# Patient Record
Sex: FEMALE | Race: WHITE | Employment: UNEMPLOYED
[De-identification: names, ages, dates, MRNs, and addresses within clinical notes are randomized per-mention and may not be internally consistent; named-entity substitution may affect disease eponyms.]

---

## 2017-09-10 ENCOUNTER — HEALTH MAINTENANCE LETTER (OUTPATIENT)
Age: 27
End: 2017-09-10

## 2019-11-08 ENCOUNTER — HEALTH MAINTENANCE LETTER (OUTPATIENT)
Age: 29
End: 2019-11-08

## 2020-02-23 ENCOUNTER — HEALTH MAINTENANCE LETTER (OUTPATIENT)
Age: 30
End: 2020-02-23

## 2020-12-06 ENCOUNTER — HEALTH MAINTENANCE LETTER (OUTPATIENT)
Age: 30
End: 2020-12-06

## 2021-09-17 LAB — PAP SMEAR - HIM PATIENT REPORTED: NEGATIVE

## 2021-09-25 ENCOUNTER — HEALTH MAINTENANCE LETTER (OUTPATIENT)
Age: 31
End: 2021-09-25

## 2021-11-19 ENCOUNTER — TRANSFERRED RECORDS (OUTPATIENT)
Dept: HEALTH INFORMATION MANAGEMENT | Facility: CLINIC | Age: 31
End: 2021-11-19
Payer: COMMERCIAL

## 2021-11-19 ENCOUNTER — MEDICAL CORRESPONDENCE (OUTPATIENT)
Dept: HEALTH INFORMATION MANAGEMENT | Facility: CLINIC | Age: 31
End: 2021-11-19
Payer: COMMERCIAL

## 2021-11-22 ENCOUNTER — TRANSCRIBE ORDERS (OUTPATIENT)
Dept: MATERNAL FETAL MEDICINE | Facility: CLINIC | Age: 31
End: 2021-11-22
Payer: COMMERCIAL

## 2021-11-22 DIAGNOSIS — O26.90 PREGNANCY RELATED CONDITION, ANTEPARTUM: Primary | ICD-10-CM

## 2021-12-31 ENCOUNTER — PRE VISIT (OUTPATIENT)
Dept: MATERNAL FETAL MEDICINE | Facility: CLINIC | Age: 31
End: 2021-12-31
Payer: COMMERCIAL

## 2022-01-04 ENCOUNTER — HOSPITAL ENCOUNTER (OUTPATIENT)
Dept: ULTRASOUND IMAGING | Facility: CLINIC | Age: 32
End: 2022-01-04
Attending: OBSTETRICS & GYNECOLOGY
Payer: COMMERCIAL

## 2022-01-04 ENCOUNTER — OFFICE VISIT (OUTPATIENT)
Dept: MATERNAL FETAL MEDICINE | Facility: CLINIC | Age: 32
End: 2022-01-04
Attending: OBSTETRICS & GYNECOLOGY
Payer: COMMERCIAL

## 2022-01-04 DIAGNOSIS — Z36.2 ENCOUNTER FOR FOLLOW-UP ULTRASOUND OF FETAL ANATOMY: ICD-10-CM

## 2022-01-04 DIAGNOSIS — O26.90 PREGNANCY RELATED CONDITION, ANTEPARTUM: ICD-10-CM

## 2022-01-04 DIAGNOSIS — O26.90 PREGNANCY RELATED CONDITION, ANTEPARTUM: Primary | ICD-10-CM

## 2022-01-04 PROCEDURE — 76811 OB US DETAILED SNGL FETUS: CPT | Mod: 26 | Performed by: OBSTETRICS & GYNECOLOGY

## 2022-01-04 PROCEDURE — 76811 OB US DETAILED SNGL FETUS: CPT

## 2022-01-04 NOTE — PROGRESS NOTES
Lisa Irene was seen for an ultrasound today at the Maternal-Fetal Medicine center.      For the details of the ultrasound please see the report which can be found under the imaging tab.      Miley Sheikh MD  , OB/GYN  Maternal-Fetal Medicine  [email or web address]  [phone] (Main MFM Office)  [id number] or [phone] (for 24 hour MFM questions)  [phone] (Pager)

## 2022-01-15 ENCOUNTER — HEALTH MAINTENANCE LETTER (OUTPATIENT)
Age: 32
End: 2022-01-15

## 2022-01-26 ENCOUNTER — HOSPITAL ENCOUNTER (OUTPATIENT)
Dept: ULTRASOUND IMAGING | Facility: CLINIC | Age: 32
End: 2022-01-26
Attending: OBSTETRICS & GYNECOLOGY
Payer: COMMERCIAL

## 2022-01-26 ENCOUNTER — OFFICE VISIT (OUTPATIENT)
Dept: MATERNAL FETAL MEDICINE | Facility: CLINIC | Age: 32
End: 2022-01-26
Attending: OBSTETRICS & GYNECOLOGY
Payer: COMMERCIAL

## 2022-01-26 DIAGNOSIS — O26.90 PREGNANCY RELATED CONDITION, ANTEPARTUM: Primary | ICD-10-CM

## 2022-01-26 DIAGNOSIS — O24.410 DIET CONTROLLED GESTATIONAL DIABETES MELLITUS (GDM), ANTEPARTUM: ICD-10-CM

## 2022-01-26 DIAGNOSIS — Z36.2 ENCOUNTER FOR FOLLOW-UP ULTRASOUND OF FETAL ANATOMY: ICD-10-CM

## 2022-01-26 PROCEDURE — 76816 OB US FOLLOW-UP PER FETUS: CPT

## 2022-01-26 PROCEDURE — 76816 OB US FOLLOW-UP PER FETUS: CPT | Mod: 26 | Performed by: OBSTETRICS & GYNECOLOGY

## 2022-01-26 NOTE — PROGRESS NOTES
The patient was seen for an ultrasound in the Maternal-Fetal Medicine Center at the [facility] today.  For a detailed report of the ultrasound examination, please see the ultrasound report which can be found under the imaging tab.    Geetha Colby MD  , OB/GYN  Maternal-Fetal Medicine  [phone] (Pager)

## 2022-02-07 ENCOUNTER — VIRTUAL VISIT (OUTPATIENT)
Dept: EDUCATION SERVICES | Facility: CLINIC | Age: 32
End: 2022-02-07
Payer: COMMERCIAL

## 2022-02-07 DIAGNOSIS — O24.419 GESTATIONAL DIABETES MELLITUS: Primary | ICD-10-CM

## 2022-02-07 PROCEDURE — G0108 DIAB MANAGE TRN  PER INDIV: HCPCS | Performed by: DIETITIAN, REGISTERED

## 2022-02-07 NOTE — LETTER
2/7/2022         RE: Lisa Irene  [address] [facility] [address]        Dear Colleague,    Thank you for referring your patient, Lisa Irene, to the [facility] [place]. Please see a copy of my visit note below.    Diabetes Self-Management Education & Support    SUBJECTIVE/OBJECTIVE:  Presents for education related to gestational diabetes.  Type of service:  Video Visit  Originating Location (pt. Location): Home  Distant Location (provider location): Home  Mode of Communication:  Video Conference via [employer]  Start: 02/07/2022 02:04 pm  Stop: 02/07/2022 02:34 pm    Accompanied by: Self  Diabetes management related comments/concerns: Took the 1 hour OGTT  Gestational weeks: 31 weeks  Previously had Gestational Diabetes: No    Cultural Influences/Ethnic Background:  Choose not to answer    Estimated Date of Delivery: Apr 7, 2022, hoping to go at 39 weeks nearer 4/1      Lifestyle and Health Behaviors:  Exercise:: Yes  Meal planning/habits: Avoiding sweets    Using whole grains / higher fiber items   Chick pea pasta   Whole grain bread     Healthy Coping:  Emotional response to diabetes: Ready to learn  Stage of change: ACTION (Actively working towards change)    Current Management:   Diet and lifestyle     ASSESSMENT/INTERVENTION:  Reviewed diet, carbohydrate counting, GDM pathophysiology, blood sugars and assessment.  Lisa is doing well so far and already checking blood sugars with an accu chek guide     Date  Breakfast Lunch Dinner    Before After After After   2/1 94 101 92 98   2/2 94 105 96 101   2/3 95  94 100   2/14 91 105 111 101   2/5 85 93 111 95   2/6 95 106 101 95   2/7 100 104 98      Is eating carbohydrates - using many low carbohydrate options / keto type things.   Has initiated a GDM meal plan and has the correct gram levels, might fall below this slightly at lunch/dinner and has room to increase carbohydrates a little based on post prandial  numbers. Feels good with carbohydrates, eating and understanding. Ketones: Negative and trace - just doing once weekly after being negative.  [place] with different hs snacks to see if this has an impact on fasting blood sugars.     Educational topics covered today:  GDM diagnosis, pathophysiology, Risks and Complications of GDM, Means of controlling GDM, Using a Blood Glucose Monitor, Blood Glucose Goals, Logging and Interpreting Glucose Results, Ketone Testing, When to Call a Diabetes Educator or OB Provider, Healthy Eating During Pregnancy, Counting Carbohydrates, Meal Planning for GDM, and Physical Activity    Educational materials provided today: (emailed)  Maureen Understanding Gestational Diabetes  GDM Log Book  Care After Delivery    Pt verbalized understanding of concepts discussed and recommendations provided today.     PLAN:  Check glucose 4 times daily, before breakfast and 1 hour after each meal.   Check Ketones daily for one week, if negative, reduce testing to once a week.   Physical activity recommended: as tolearted.    Meal plan: 30-45 carbs at breakfast, 45-60 carbs at lunch, 45-60 carbs at supper, 15-30 carbs at 3 snacks a day.  Follow consistent CHO meal plan, eat CHO and protein/fat at all meals/snacks.    Call/e-mail/[employer] message diabetes educator if 3 or more blood sugars are above the goal in 1 week, if ketones are positive, or with questions/concerns.    Catherine Zuñiga RD, LD, [facility]  Diabetes Education    Time Spent: 30 minutes  Encounter Type: Individual       A copy of this encounter was shared with the provider.

## 2022-02-07 NOTE — PROGRESS NOTES
Diabetes Self-Management Education & Support    SUBJECTIVE/OBJECTIVE:  Presents for education related to gestational diabetes.  Type of service:  Video Visit  Originating Location (pt. Location): Home  Distant Location (provider location): Home  Mode of Communication:  Video Conference via [employer]  Start: 02/07/2022 02:04 pm  Stop: 02/07/2022 02:34 pm    Accompanied by: Self  Diabetes management related comments/concerns: Took the 1 hour OGTT  Gestational weeks: 31 weeks  Previously had Gestational Diabetes: No    Cultural Influences/Ethnic Background:  Choose not to answer    Estimated Date of Delivery: Apr 7, 2022, hoping to go at 39 weeks nearer 4/1      Lifestyle and Health Behaviors:  Exercise:: Yes  Meal planning/habits: Avoiding sweets    Using whole grains / higher fiber items   Chick pea pasta   Whole grain bread     Healthy Coping:  Emotional response to diabetes: Ready to learn  Stage of change: ACTION (Actively working towards change)    Current Management:   Diet and lifestyle     ASSESSMENT/INTERVENTION:  Reviewed diet, carbohydrate counting, GDM pathophysiology, blood sugars and assessment.  Lisa is doing well so far and already checking blood sugars with an accu chek guide     Date  Breakfast Lunch Dinner    Before After After After   2/1 94 101 92 98   2/2 94 105 96 101   2/3 95  94 100   2/14 91 105 111 101   2/5 85 93 111 95   2/6 95 106 101 95   2/7 100 104 98      Is eating carbohydrates - using many low carbohydrate options / keto type things.   Has initiated a GDM meal plan and has the correct gram levels, might fall below this slightly at lunch/dinner and has room to increase carbohydrates a little based on post prandial numbers. Feels good with carbohydrates, eating and understanding. Ketones: Negative and trace - just doing once weekly after being negative.  [place] with different hs snacks to see if this has an impact on fasting blood sugars.     Educational topics covered  today:  GDM diagnosis, pathophysiology, Risks and Complications of GDM, Means of controlling GDM, Using a Blood Glucose Monitor, Blood Glucose Goals, Logging and Interpreting Glucose Results, Ketone Testing, When to Call a Diabetes Educator or OB Provider, Healthy Eating During Pregnancy, Counting Carbohydrates, Meal Planning for GDM, and Physical Activity    Educational materials provided today: (emailed)  Maureen Ellis Gestational Diabetes  GDM Log Book  Care After Delivery    Pt verbalized understanding of concepts discussed and recommendations provided today.     PLAN:  Check glucose 4 times daily, before breakfast and 1 hour after each meal.   Check Ketones daily for one week, if negative, reduce testing to once a week.   Physical activity recommended: as tolearted.    Meal plan: 30-45 carbs at breakfast, 45-60 carbs at lunch, 45-60 carbs at supper, 15-30 carbs at 3 snacks a day.  Follow consistent CHO meal plan, eat CHO and protein/fat at all meals/snacks.    Call/e-mail/MyChart message diabetes educator if 3 or more blood sugars are above the goal in 1 week, if ketones are positive, or with questions/concerns.    Catherine Zuñiga RD, LD, [facility]  Diabetes Education    Time Spent: 30 minutes  Encounter Type: Individual       A copy of this encounter was shared with the provider.

## 2022-02-14 ENCOUNTER — VIRTUAL VISIT (OUTPATIENT)
Dept: EDUCATION SERVICES | Facility: CLINIC | Age: 32
End: 2022-02-14
Payer: COMMERCIAL

## 2022-02-14 DIAGNOSIS — O24.419 GESTATIONAL DIABETES MELLITUS: ICD-10-CM

## 2022-02-14 PROCEDURE — 98967 PH1 ASSMT&MGMT NQHP 11-20: CPT | Mod: 95 | Performed by: DIETITIAN, REGISTERED

## 2022-02-14 NOTE — PROGRESS NOTES
Diabetes Self-Management Education & Support    SUBJECTIVE/OBJECTIVE:  Presents for education related to gestational diabetes.  Type of Service: Telephone Visit  Originating Location (Patient Location): Home  Distant Location (Provider Location): Home  Mode of Communication:  video  start: 02/14/2022 02:03 pm  Stop: 02/14/2022 02:18 pm    Accompanied by: Self  Diabetes management related comments/concerns: Took the 1 hour OGTT  Gestational weeks: 31 weeks  Previously had Gestational Diabetes: No    Cultural Influences/Ethnic Background:  Choose not to answer    LMP 07/01/2021       Lifestyle and Health Behaviors:  Exercise:: Yes  Meal planning/habits: Avoiding sweets    Healthy Coping:  Emotional response to diabetes: Ready to learn  Stage of change: ACTION (Actively working towards change)    Current Management:   diet & Lifestyle     ASSESSMENT:  Ketones: negative (mod one time after not feeling well at night) .   Fasting blood glucoses: 14% in target.  After breakfast: 100% in target.  After lunch: 100% in target.  After dinner: 100% in target    Date Breakfast Breakfast Lunch Dinner    Before After After After   2/8 102 127 102 120   2/9 99 107 105 96   2/10 96 127 113 108   2/11 93 97 122 85   2/12 98 104 105 95   2/13 98 126  91   2/14 98 98 104    Added a protein waffle at breakfast at times (kodiak cakes)  Feels like the other meals are 45gm     INTERVENTION:  Reviewed diet and foods; feels to be eating enough and has increased carbohydrates slightly.  Doesn't feel over restricted at all.  Has experimented with both food and exercise at night.  Fasting blood sugars remain elevated.  Reviewed care after delivery.  Will fax note back to referring provider.  Per Elliott DELEON if insulin is needed refer back.   Lisa already told her provider about thehigher fasting blood sugars as well and thinks they may have already put in a referral to [facility] endocrinology.      Educational topics covered today:  What to  expect after delivery, Future testing for Type 2 diabetes (2 hour OGTT at 6 week post-partum check-up and annual fasting blood glucose level), Risk of GDM and planning ahead for future pregnancies, Recommended lifestyle interventions for reducing the risk of Type 2 Diabetes, When to Call a Diabetes Educator or OB Provider    Educational Materials provided today:  Maureen Preventing Diabetes    PLAN:  Check glucose 4 times daily.  Check ketones once a week when readings are consistently negative.  Continue with recommended physical activity.  Continue to follow recommended meal plan: 2-3 carbs at breakfast, 3-4 carbs at lunch, 3-4 carbs at supper, 1-2 carbs at snacks.  Follow consistent CHO meal plan, eat CHO and protein/fat at all meals/snacks.        Catherine Zuñiga RD, LD, [facility]  Diabetes Education    Time Spent: 15 minutes  Encounter Type: Individual     A copy of this encounter was shared with the provider.

## 2022-02-14 NOTE — LETTER
2/14/2022         RE: Lisa Irene  [address] [facility] [address]        Dear Colleague,    Thank you for referring your patient, Lisa Irene, to the [facility] [place]. Please see a copy of my visit note below.   Will refer back to you as fasting blood sugars are above target and it appears that patient may need insulin.            Date Breakfast Breakfast Lunch Dinner    Before After After After   2/8 102 127 102 120   2/9 99 107 105 96   2/10 96 127 113 108   2/11 93 97 122 85   2/12 98 104 105 95   2/13 98 126  91   2/14 98 98 104      Feels to be eating enough -   Negative ketones on Thursday  Added a protein waffle at breakfast   Feels like the other meals are 45gm     art: 02/14/2022 02:03 pm  Stop: 02/14/2022 02:18 pm        Diabetes Self-Management Education & Support    SUBJECTIVE/OBJECTIVE:  Presents for education related to gestational diabetes.  Type of Service: Telephone Visit  Originating Location (Patient Location): Home  Distant Location (Provider Location): Home  Mode of Communication:  video  start: 02/14/2022 02:03 pm  Stop: 02/14/2022 02:18 pm    Accompanied by: Self  Diabetes management related comments/concerns: Took the 1 hour OGTT  Gestational weeks: 31 weeks  Previously had Gestational Diabetes: No    Cultural Influences/Ethnic Background:  Choose not to answer    LMP 07/01/2021       Lifestyle and Health Behaviors:  Exercise:: Yes  Meal planning/habits: Avoiding sweets    Healthy Coping:  Emotional response to diabetes: Ready to learn  Stage of change: ACTION (Actively working towards change)    Current Management:   diet & Lifestyle     ASSESSMENT:  Ketones: negative (mod one time after not feeling well at night) .   Fasting blood glucoses: 14% in target.  After breakfast: 100% in target.  After lunch: 100% in target.  After dinner: 100% in target    Date Breakfast Breakfast Lunch Dinner    Before After After After   2/8 102 127 102 120    2/9 99 107 105 96   2/10 96 127 113 108   2/11 93 97 122 85   2/12 98 104 105 95   2/13 98 126  91   2/14 98 98 104    Added a protein waffle at breakfast at times (kodiak cakes)  Feels like the other meals are 45gm     INTERVENTION:  Reviewed diet and foods; feels to be eating enough and has increased carbohydrates slightly.  Doesn't feel over restricted at all.  Has experimented with both food and exercise at night.  Fasting blood sugars remain elevated.  Reviewed care after delivery.  Will fax note back to referring provider.  Per Elliott DELEON if insulin is needed refer back.   Lisa already told her provider about thehigher fasting blood sugars as well and thinks they may have already put in a referral to [facility] endocrinology.      Educational topics covered today:  What to expect after delivery, Future testing for Type 2 diabetes (2 hour OGTT at 6 week post-partum check-up and annual fasting blood glucose level), Risk of GDM and planning ahead for future pregnancies, Recommended lifestyle interventions for reducing the risk of Type 2 Diabetes, When to Call a Diabetes Educator or OB Provider    Educational Materials provided today:  Maureen Preventing Diabetes    PLAN:  Check glucose 4 times daily.  Check ketones once a week when readings are consistently negative.  Continue with recommended physical activity.  Continue to follow recommended meal plan: 2-3 carbs at breakfast, 3-4 carbs at lunch, 3-4 carbs at supper, 1-2 carbs at snacks.  Follow consistent CHO meal plan, eat CHO and protein/fat at all meals/snacks.        Catherine Zuñiga RD, LD, [facility]  Diabetes Education    Time Spent: 15 minutes  Encounter Type: Individual     A copy of this encounter was shared with the provider.

## 2022-02-14 NOTE — Clinical Note
Hello!  Referring this patinet back to Elliott DELEON (endocrinology) with high fasting blood sugars and needing insulin.  I routed the note to the CMN in communication management. Do you or could triage call Elliott tomorrow to assure that they are aware we are referring them back?   Or does a routed in suffice?  I can't remember the process when [place].  Thanks! :) Shaneka Zuñiga RD, LD, [facility]  Diabetes Education

## 2022-03-24 DIAGNOSIS — Z33.1 PREGNANCY, INCIDENTAL: Primary | ICD-10-CM

## 2022-03-28 ENCOUNTER — HOSPITAL ENCOUNTER (OUTPATIENT)
Facility: CLINIC | Age: 32
Discharge: HOME | End: 2022-03-28
Attending: OBSTETRICS & GYNECOLOGY | Admitting: OBSTETRICS & GYNECOLOGY
Payer: COMMERCIAL

## 2022-03-28 ENCOUNTER — DOCUMENTATION ONLY (OUTPATIENT)
Dept: OTHER | Facility: CLINIC | Age: 32
End: 2022-03-28

## 2022-03-28 VITALS
HEIGHT: 66 IN | BODY MASS INDEX: 29.89 KG/M2 | DIASTOLIC BLOOD PRESSURE: 66 MMHG | SYSTOLIC BLOOD PRESSURE: 114 MMHG | HEART RATE: 80 BPM | TEMPERATURE: 98.4 F | WEIGHT: 186 LBS

## 2022-03-28 LAB
ALT SERPL W P-5'-P-CCNC: 14 U/L (ref 0–50)
AST SERPL W P-5'-P-CCNC: 12 U/L (ref 0–45)
CREAT SERPL-MCNC: 0.7 MG/DL (ref 0.52–1.04)
CREAT UR-MCNC: 23 MG/DL
ERYTHROCYTE [DISTWIDTH] IN BLOOD BY AUTOMATED COUNT: 12.6 % (ref 10–15)
GFR SERPL CREATININE-BSD FRML MDRD: >90 ML/MIN/1.73M2
HCT VFR BLD AUTO: 36.1 % (ref 35–47)
HGB BLD-MCNC: 11.9 G/DL (ref 11.7–15.7)
HOLD SPECIMEN: NORMAL
HOLD SPECIMEN: NORMAL
MCH RBC QN AUTO: 32.9 PG (ref 26.5–33)
MCHC RBC AUTO-ENTMCNC: 33 G/DL (ref 31.5–36.5)
MCV RBC AUTO: 100 FL (ref 78–100)
PLATELET # BLD AUTO: 260 10E3/UL (ref 150–450)
PROT UR-MCNC: <0.05 G/L
PROT/CREAT 24H UR: NORMAL MG/G{CREAT}
RBC # BLD AUTO: 3.62 10E6/UL (ref 3.8–5.2)
SARS-COV-2 RNA RESP QL NAA+PROBE: NEGATIVE
WBC # BLD AUTO: 13 10E3/UL (ref 4–11)

## 2022-03-28 PROCEDURE — C9803 HOPD COVID-19 SPEC COLLECT: HCPCS

## 2022-03-28 PROCEDURE — 36415 COLL VENOUS BLD VENIPUNCTURE: CPT | Performed by: MIDWIFE

## 2022-03-28 PROCEDURE — G0463 HOSPITAL OUTPT CLINIC VISIT: HCPCS | Mod: 25

## 2022-03-28 PROCEDURE — U0003 INFECTIOUS AGENT DETECTION BY NUCLEIC ACID (DNA OR RNA); SEVERE ACUTE RESPIRATORY SYNDROME CORONAVIRUS 2 (SARS-COV-2) (CORONAVIRUS DISEASE [COVID-19]), AMPLIFIED PROBE TECHNIQUE, MAKING USE OF HIGH THROUGHPUT TECHNOLOGIES AS DESCRIBED BY CMS-2020-01-R: HCPCS | Performed by: OBSTETRICS & GYNECOLOGY

## 2022-03-28 PROCEDURE — 84450 TRANSFERASE (AST) (SGOT): CPT | Performed by: MIDWIFE

## 2022-03-28 PROCEDURE — 59025 FETAL NON-STRESS TEST: CPT

## 2022-03-28 PROCEDURE — 84156 ASSAY OF PROTEIN URINE: CPT | Performed by: MIDWIFE

## 2022-03-28 PROCEDURE — 84460 ALANINE AMINO (ALT) (SGPT): CPT | Performed by: MIDWIFE

## 2022-03-28 PROCEDURE — 82565 ASSAY OF CREATININE: CPT | Performed by: MIDWIFE

## 2022-03-28 PROCEDURE — 85027 COMPLETE CBC AUTOMATED: CPT | Performed by: MIDWIFE

## 2022-03-28 PROCEDURE — 85048 AUTOMATED LEUKOCYTE COUNT: CPT | Performed by: MIDWIFE

## 2022-03-28 RX ORDER — METOCLOPRAMIDE 10 MG/1
10 TABLET ORAL EVERY 6 HOURS PRN
Status: DISCONTINUED | OUTPATIENT
Start: 2022-03-28 | End: 2022-03-28 | Stop reason: HOSPADM

## 2022-03-28 RX ORDER — ONDANSETRON 2 MG/ML
4 INJECTION INTRAMUSCULAR; INTRAVENOUS EVERY 6 HOURS PRN
Status: DISCONTINUED | OUTPATIENT
Start: 2022-03-28 | End: 2022-03-28 | Stop reason: HOSPADM

## 2022-03-28 RX ORDER — ONDANSETRON 4 MG/1
4 TABLET, ORALLY DISINTEGRATING ORAL EVERY 6 HOURS PRN
Status: DISCONTINUED | OUTPATIENT
Start: 2022-03-28 | End: 2022-03-28 | Stop reason: HOSPADM

## 2022-03-28 RX ORDER — PROCHLORPERAZINE MALEATE 5 MG
10 TABLET ORAL EVERY 6 HOURS PRN
Status: DISCONTINUED | OUTPATIENT
Start: 2022-03-28 | End: 2022-03-28 | Stop reason: HOSPADM

## 2022-03-28 RX ORDER — INSULIN HUMAN 100 [IU]/ML
12 INJECTION, SUSPENSION SUBCUTANEOUS AT BEDTIME
Status: ON HOLD | COMMUNITY
Start: 2022-03-10 | End: 2022-04-01

## 2022-03-28 RX ORDER — PROCHLORPERAZINE 25 MG
25 SUPPOSITORY, RECTAL RECTAL EVERY 12 HOURS PRN
Status: DISCONTINUED | OUTPATIENT
Start: 2022-03-28 | End: 2022-03-28 | Stop reason: HOSPADM

## 2022-03-28 RX ORDER — VITAMIN A ACETATE, BETA CAROTENE, ASCORBIC ACID, CHOLECALCIFEROL, .ALPHA.-TOCOPHEROL ACETATE, DL-, THIAMINE MONONITRATE, RIBOFLAVIN, NIACINAMIDE, PYRIDOXINE HYDROCHLORIDE, FOLIC ACID, CYANOCOBALAMIN, CALCIUM CARBONATE, FERROUS FUMARATE, ZINC OXIDE, CUPRIC OXIDE 3080; 12; 120; 400; 1; 1.84; 3; 20; 22; 920; 25; 200; 27; 10; 2 [IU]/1; UG/1; MG/1; [IU]/1; MG/1; MG/1; MG/1; MG/1; MG/1; [IU]/1; MG/1; MG/1; MG/1; MG/1; MG/1
1 TABLET, FILM COATED ORAL DAILY
COMMUNITY

## 2022-03-28 RX ORDER — SODIUM CHLORIDE, SODIUM LACTATE, POTASSIUM CHLORIDE, CALCIUM CHLORIDE 600; 310; 30; 20 MG/100ML; MG/100ML; MG/100ML; MG/100ML
INJECTION, SOLUTION INTRAVENOUS CONTINUOUS
Status: DISCONTINUED | OUTPATIENT
Start: 2022-03-28 | End: 2022-03-28

## 2022-03-28 RX ORDER — METOCLOPRAMIDE HYDROCHLORIDE 5 MG/ML
10 INJECTION INTRAMUSCULAR; INTRAVENOUS EVERY 6 HOURS PRN
Status: DISCONTINUED | OUTPATIENT
Start: 2022-03-28 | End: 2022-03-28 | Stop reason: HOSPADM

## 2022-03-28 NOTE — DISCHARGE INSTRUCTIONS
Discharge Instruction for Undelivered Patients      You were seen for: Fetal Assessment and Blood Pressure Assessment  We Consulted: Sherry Salgado & Dr. Maravilla  You had (Test or Medicine):fetal monitoring (NST:reactive); Pre-Eclampsia labs    Diet:   Drink 8 to 12 glasses of liquids (milk, juice, water) every day.  You may eat meals and snacks.     Activity:  Call your doctor or nurse midwife if your baby is moving less than usual.     Call your provider if you notice:  Swelling in your face or increased swelling in your hands or legs.  Headaches that are not relieved by Tylenol (acetaminophen).  Changes in your vision (blurring: seeing spots or stars.)  Nausea (sick to your stomach) and vomiting (throwing up).   Weight gain of 5 pounds or more per week.  Heartburn that doesn't go away.  Signs of bladder infection: pain when you urinate (use the toilet), need to go more often and more urgently.  The bag of [place] (rupture of membranes) breaks, or you notice leaking in your underwear.  Bright red blood in your underwear.  Abdominal (lower belly) or stomach pain.  For first baby: Contractions (tightening) less than 5 minutes apart for one hour or more.  Second (plus) baby: Contractions (tightening) less than 10 minutes apart and getting stronger.  *If less than 34 weeks: Contractions (tightening) more than 6 times in one hour.  Increase or change in vaginal discharge (note the color and amount)      Follow-up:  As scheduled in the clinic on Wednesday for her BPP, or for your scheduled induction in Labor & Delivery.

## 2022-03-31 ENCOUNTER — TRANSFERRED RECORDS (OUTPATIENT)
Dept: HEALTH INFORMATION MANAGEMENT | Facility: CLINIC | Age: 32
End: 2022-03-31

## 2022-03-31 ENCOUNTER — ANESTHESIA (OUTPATIENT)
Dept: OBGYN | Facility: CLINIC | Age: 32
End: 2022-03-31
Payer: COMMERCIAL

## 2022-03-31 ENCOUNTER — HOSPITAL ENCOUNTER (INPATIENT)
Facility: CLINIC | Age: 32
LOS: 1 days | Discharge: HOME | End: 2022-04-01
Attending: OBSTETRICS & GYNECOLOGY | Admitting: OBSTETRICS & GYNECOLOGY
Payer: COMMERCIAL

## 2022-03-31 ENCOUNTER — ANESTHESIA EVENT (OUTPATIENT)
Dept: OBGYN | Facility: CLINIC | Age: 32
End: 2022-03-31
Payer: COMMERCIAL

## 2022-03-31 LAB
ABO/RH(D): NORMAL
ANTIBODY SCREEN: NEGATIVE
ERYTHROCYTE [DISTWIDTH] IN BLOOD BY AUTOMATED COUNT: 12.9 % (ref 10–15)
GLUCOSE BLD-MCNC: 114 MG/DL (ref 70–99)
GLUCOSE BLDC GLUCOMTR-MCNC: 128 MG/DL (ref 70–99)
GLUCOSE BLDC GLUCOMTR-MCNC: 69 MG/DL (ref 70–99)
GLUCOSE BLDC GLUCOMTR-MCNC: 84 MG/DL (ref 70–99)
HCT VFR BLD AUTO: 35.7 % (ref 35–47)
HGB BLD-MCNC: 12 G/DL (ref 11.7–15.7)
MCH RBC QN AUTO: 32.9 PG (ref 26.5–33)
MCHC RBC AUTO-ENTMCNC: 33.6 G/DL (ref 31.5–36.5)
MCV RBC AUTO: 98 FL (ref 78–100)
PLATELET # BLD AUTO: 274 10E3/UL (ref 150–450)
RBC # BLD AUTO: 3.65 10E6/UL (ref 3.8–5.2)
SPECIMEN EXPIRATION DATE: NORMAL
T PALLIDUM AB SER QL: NONREACTIVE
WBC # BLD AUTO: 11.9 10E3/UL (ref 4–11)

## 2022-03-31 PROCEDURE — 85027 COMPLETE CBC AUTOMATED: CPT | Performed by: OBSTETRICS & GYNECOLOGY

## 2022-03-31 PROCEDURE — 36415 COLL VENOUS BLD VENIPUNCTURE: CPT | Performed by: OBSTETRICS & GYNECOLOGY

## 2022-03-31 PROCEDURE — 722N000001 HC LABOR CARE VAGINAL DELIVERY SINGLE

## 2022-03-31 PROCEDURE — 250N000011 HC RX IP 250 OP 636: Performed by: ANESTHESIOLOGY

## 2022-03-31 PROCEDURE — 00HU33Z INSERTION OF INFUSION DEVICE INTO SPINAL CANAL, PERCUTANEOUS APPROACH: ICD-10-PCS | Performed by: OBSTETRICS & GYNECOLOGY

## 2022-03-31 PROCEDURE — 86901 BLOOD TYPING SEROLOGIC RH(D): CPT | Performed by: OBSTETRICS & GYNECOLOGY

## 2022-03-31 PROCEDURE — 258N000003 HC RX IP 258 OP 636: Performed by: OBSTETRICS & GYNECOLOGY

## 2022-03-31 PROCEDURE — 250N000011 HC RX IP 250 OP 636: Performed by: OBSTETRICS & GYNECOLOGY

## 2022-03-31 PROCEDURE — 10907ZC DRAINAGE OF AMNIOTIC FLUID, THERAPEUTIC FROM PRODUCTS OF CONCEPTION, VIA NATURAL OR ARTIFICIAL OPENING: ICD-10-PCS | Performed by: OBSTETRICS & GYNECOLOGY

## 2022-03-31 PROCEDURE — 258N000003 HC RX IP 258 OP 636: Performed by: ANESTHESIOLOGY

## 2022-03-31 PROCEDURE — 82947 ASSAY GLUCOSE BLOOD QUANT: CPT | Performed by: OBSTETRICS & GYNECOLOGY

## 2022-03-31 PROCEDURE — 86780 TREPONEMA PALLIDUM: CPT | Performed by: OBSTETRICS & GYNECOLOGY

## 2022-03-31 PROCEDURE — 250N000013 HC RX MED GY IP 250 OP 250 PS 637: Performed by: OBSTETRICS & GYNECOLOGY

## 2022-03-31 PROCEDURE — 250N000009 HC RX 250: Performed by: OBSTETRICS & GYNECOLOGY

## 2022-03-31 PROCEDURE — 3E033VJ INTRODUCTION OF OTHER HORMONE INTO PERIPHERAL VEIN, PERCUTANEOUS APPROACH: ICD-10-PCS | Performed by: OBSTETRICS & GYNECOLOGY

## 2022-03-31 PROCEDURE — 120N000012 HC R&B POSTPARTUM

## 2022-03-31 PROCEDURE — 370N000003 HC ANESTHESIA WARD SERVICE

## 2022-03-31 RX ORDER — OXYTOCIN/0.9 % SODIUM CHLORIDE 30/500 ML
100-340 PLASTIC BAG, INJECTION (ML) INTRAVENOUS CONTINUOUS PRN
Status: DISCONTINUED | OUTPATIENT
Start: 2022-03-31 | End: 2022-04-01 | Stop reason: HOSPADM

## 2022-03-31 RX ORDER — CARBOPROST TROMETHAMINE 250 UG/ML
250 INJECTION, SOLUTION INTRAMUSCULAR
Status: DISCONTINUED | OUTPATIENT
Start: 2022-03-31 | End: 2022-04-01 | Stop reason: HOSPADM

## 2022-03-31 RX ORDER — OXYTOCIN 10 [USP'U]/ML
10 INJECTION, SOLUTION INTRAMUSCULAR; INTRAVENOUS
Status: DISCONTINUED | OUTPATIENT
Start: 2022-03-31 | End: 2022-04-01 | Stop reason: HOSPADM

## 2022-03-31 RX ORDER — OXYCODONE HYDROCHLORIDE 5 MG/1
5 TABLET ORAL EVERY 4 HOURS PRN
Status: DISCONTINUED | OUTPATIENT
Start: 2022-03-31 | End: 2022-04-01 | Stop reason: HOSPADM

## 2022-03-31 RX ORDER — ONDANSETRON 2 MG/ML
4 INJECTION INTRAMUSCULAR; INTRAVENOUS EVERY 6 HOURS PRN
Status: DISCONTINUED | OUTPATIENT
Start: 2022-03-31 | End: 2022-03-31 | Stop reason: HOSPADM

## 2022-03-31 RX ORDER — SODIUM CHLORIDE, SODIUM LACTATE, POTASSIUM CHLORIDE, CALCIUM CHLORIDE 600; 310; 30; 20 MG/100ML; MG/100ML; MG/100ML; MG/100ML
INJECTION, SOLUTION INTRAVENOUS CONTINUOUS
Status: DISCONTINUED | OUTPATIENT
Start: 2022-03-31 | End: 2022-03-31 | Stop reason: HOSPADM

## 2022-03-31 RX ORDER — NALOXONE HYDROCHLORIDE 0.4 MG/ML
0.2 INJECTION, SOLUTION INTRAMUSCULAR; INTRAVENOUS; SUBCUTANEOUS
Status: DISCONTINUED | OUTPATIENT
Start: 2022-03-31 | End: 2022-04-01 | Stop reason: HOSPADM

## 2022-03-31 RX ORDER — MISOPROSTOL 200 UG/1
400 TABLET ORAL
Status: DISCONTINUED | OUTPATIENT
Start: 2022-03-31 | End: 2022-03-31 | Stop reason: HOSPADM

## 2022-03-31 RX ORDER — DOCUSATE SODIUM 100 MG/1
100 CAPSULE, LIQUID FILLED ORAL DAILY
Status: DISCONTINUED | OUTPATIENT
Start: 2022-03-31 | End: 2022-04-01 | Stop reason: HOSPADM

## 2022-03-31 RX ORDER — METOCLOPRAMIDE 10 MG/1
10 TABLET ORAL EVERY 6 HOURS PRN
Status: DISCONTINUED | OUTPATIENT
Start: 2022-03-31 | End: 2022-03-31 | Stop reason: HOSPADM

## 2022-03-31 RX ORDER — NICOTINE POLACRILEX 4 MG
15-30 LOZENGE BUCCAL
Status: DISCONTINUED | OUTPATIENT
Start: 2022-03-31 | End: 2022-03-31 | Stop reason: HOSPADM

## 2022-03-31 RX ORDER — MISOPROSTOL 200 UG/1
400 TABLET ORAL
Status: DISCONTINUED | OUTPATIENT
Start: 2022-03-31 | End: 2022-04-01 | Stop reason: HOSPADM

## 2022-03-31 RX ORDER — KETOROLAC TROMETHAMINE 30 MG/ML
30 INJECTION, SOLUTION INTRAMUSCULAR; INTRAVENOUS
Status: DISCONTINUED | OUTPATIENT
Start: 2022-03-31 | End: 2022-03-31

## 2022-03-31 RX ORDER — NALOXONE HYDROCHLORIDE 0.4 MG/ML
0.4 INJECTION, SOLUTION INTRAMUSCULAR; INTRAVENOUS; SUBCUTANEOUS
Status: DISCONTINUED | OUTPATIENT
Start: 2022-03-31 | End: 2022-04-01 | Stop reason: HOSPADM

## 2022-03-31 RX ORDER — DEXTROSE MONOHYDRATE 25 G/50ML
25-50 INJECTION, SOLUTION INTRAVENOUS
Status: DISCONTINUED | OUTPATIENT
Start: 2022-03-31 | End: 2022-04-01 | Stop reason: HOSPADM

## 2022-03-31 RX ORDER — SODIUM CHLORIDE 9 MG/ML
INJECTION, SOLUTION INTRAVENOUS CONTINUOUS
Status: DISCONTINUED | OUTPATIENT
Start: 2022-03-31 | End: 2022-03-31 | Stop reason: HOSPADM

## 2022-03-31 RX ORDER — TRANEXAMIC ACID 10 MG/ML
1 INJECTION, SOLUTION INTRAVENOUS EVERY 30 MIN PRN
Status: DISCONTINUED | OUTPATIENT
Start: 2022-03-31 | End: 2022-04-01 | Stop reason: HOSPADM

## 2022-03-31 RX ORDER — PROCHLORPERAZINE MALEATE 5 MG
10 TABLET ORAL EVERY 6 HOURS PRN
Status: DISCONTINUED | OUTPATIENT
Start: 2022-03-31 | End: 2022-03-31 | Stop reason: HOSPADM

## 2022-03-31 RX ORDER — MODIFIED LANOLIN
OINTMENT (GRAM) TOPICAL
Status: DISCONTINUED | OUTPATIENT
Start: 2022-03-31 | End: 2022-04-01 | Stop reason: HOSPADM

## 2022-03-31 RX ORDER — METOCLOPRAMIDE HYDROCHLORIDE 5 MG/ML
10 INJECTION INTRAMUSCULAR; INTRAVENOUS EVERY 6 HOURS PRN
Status: DISCONTINUED | OUTPATIENT
Start: 2022-03-31 | End: 2022-03-31 | Stop reason: HOSPADM

## 2022-03-31 RX ORDER — IBUPROFEN 400 MG/1
800 TABLET, FILM COATED ORAL
Status: DISCONTINUED | OUTPATIENT
Start: 2022-03-31 | End: 2022-03-31

## 2022-03-31 RX ORDER — IBUPROFEN 400 MG/1
800 TABLET, FILM COATED ORAL EVERY 6 HOURS PRN
Status: DISCONTINUED | OUTPATIENT
Start: 2022-03-31 | End: 2022-04-01 | Stop reason: HOSPADM

## 2022-03-31 RX ORDER — NALOXONE HYDROCHLORIDE 0.4 MG/ML
0.4 INJECTION, SOLUTION INTRAMUSCULAR; INTRAVENOUS; SUBCUTANEOUS
Status: DISCONTINUED | OUTPATIENT
Start: 2022-03-31 | End: 2022-03-31 | Stop reason: HOSPADM

## 2022-03-31 RX ORDER — OXYTOCIN/0.9 % SODIUM CHLORIDE 30/500 ML
1-24 PLASTIC BAG, INJECTION (ML) INTRAVENOUS CONTINUOUS
Status: DISCONTINUED | OUTPATIENT
Start: 2022-03-31 | End: 2022-03-31 | Stop reason: HOSPADM

## 2022-03-31 RX ORDER — NICOTINE POLACRILEX 4 MG
15-30 LOZENGE BUCCAL
Status: DISCONTINUED | OUTPATIENT
Start: 2022-03-31 | End: 2022-04-01 | Stop reason: HOSPADM

## 2022-03-31 RX ORDER — OXYTOCIN/0.9 % SODIUM CHLORIDE 30/500 ML
340 PLASTIC BAG, INJECTION (ML) INTRAVENOUS CONTINUOUS PRN
Status: DISCONTINUED | OUTPATIENT
Start: 2022-03-31 | End: 2022-04-01 | Stop reason: HOSPADM

## 2022-03-31 RX ORDER — PROCHLORPERAZINE 25 MG
25 SUPPOSITORY, RECTAL RECTAL EVERY 12 HOURS PRN
Status: DISCONTINUED | OUTPATIENT
Start: 2022-03-31 | End: 2022-03-31 | Stop reason: HOSPADM

## 2022-03-31 RX ORDER — CITRIC ACID/SODIUM CITRATE 334-500MG
30 SOLUTION, ORAL ORAL
Status: DISCONTINUED | OUTPATIENT
Start: 2022-03-31 | End: 2022-03-31 | Stop reason: HOSPADM

## 2022-03-31 RX ORDER — TRANEXAMIC ACID 10 MG/ML
1 INJECTION, SOLUTION INTRAVENOUS EVERY 30 MIN PRN
Status: DISCONTINUED | OUTPATIENT
Start: 2022-03-31 | End: 2022-03-31 | Stop reason: HOSPADM

## 2022-03-31 RX ORDER — BISACODYL 10 MG
10 SUPPOSITORY, RECTAL RECTAL DAILY PRN
Status: DISCONTINUED | OUTPATIENT
Start: 2022-03-31 | End: 2022-04-01 | Stop reason: HOSPADM

## 2022-03-31 RX ORDER — EPHEDRINE SULFATE 50 MG/ML
5 INJECTION, SOLUTION INTRAMUSCULAR; INTRAVENOUS; SUBCUTANEOUS
Status: DISCONTINUED | OUTPATIENT
Start: 2022-03-31 | End: 2022-03-31 | Stop reason: HOSPADM

## 2022-03-31 RX ORDER — DEXTROSE, SODIUM CHLORIDE, SODIUM LACTATE, POTASSIUM CHLORIDE, AND CALCIUM CHLORIDE 5; .6; .31; .03; .02 G/100ML; G/100ML; G/100ML; G/100ML; G/100ML
INJECTION, SOLUTION INTRAVENOUS CONTINUOUS
Status: DISCONTINUED | OUTPATIENT
Start: 2022-03-31 | End: 2022-03-31 | Stop reason: HOSPADM

## 2022-03-31 RX ORDER — NALOXONE HYDROCHLORIDE 0.4 MG/ML
0.2 INJECTION, SOLUTION INTRAMUSCULAR; INTRAVENOUS; SUBCUTANEOUS
Status: DISCONTINUED | OUTPATIENT
Start: 2022-03-31 | End: 2022-03-31 | Stop reason: HOSPADM

## 2022-03-31 RX ORDER — OXYTOCIN 10 [USP'U]/ML
10 INJECTION, SOLUTION INTRAMUSCULAR; INTRAVENOUS
Status: DISCONTINUED | OUTPATIENT
Start: 2022-03-31 | End: 2022-03-31 | Stop reason: HOSPADM

## 2022-03-31 RX ORDER — CARBOPROST TROMETHAMINE 250 UG/ML
250 INJECTION, SOLUTION INTRAMUSCULAR
Status: DISCONTINUED | OUTPATIENT
Start: 2022-03-31 | End: 2022-03-31 | Stop reason: HOSPADM

## 2022-03-31 RX ORDER — METHYLERGONOVINE MALEATE 0.2 MG/ML
200 INJECTION INTRAVENOUS
Status: DISCONTINUED | OUTPATIENT
Start: 2022-03-31 | End: 2022-04-01 | Stop reason: HOSPADM

## 2022-03-31 RX ORDER — ACETAMINOPHEN 325 MG/1
650 TABLET ORAL EVERY 4 HOURS PRN
Status: DISCONTINUED | OUTPATIENT
Start: 2022-03-31 | End: 2022-04-01 | Stop reason: HOSPADM

## 2022-03-31 RX ORDER — OXYTOCIN/0.9 % SODIUM CHLORIDE 30/500 ML
340 PLASTIC BAG, INJECTION (ML) INTRAVENOUS CONTINUOUS PRN
Status: DISCONTINUED | OUTPATIENT
Start: 2022-03-31 | End: 2022-03-31 | Stop reason: HOSPADM

## 2022-03-31 RX ORDER — HYDROCORTISONE 2.5 %
CREAM (GRAM) TOPICAL 3 TIMES DAILY PRN
Status: DISCONTINUED | OUTPATIENT
Start: 2022-03-31 | End: 2022-04-01 | Stop reason: HOSPADM

## 2022-03-31 RX ORDER — ONDANSETRON 4 MG/1
4 TABLET, ORALLY DISINTEGRATING ORAL EVERY 6 HOURS PRN
Status: DISCONTINUED | OUTPATIENT
Start: 2022-03-31 | End: 2022-03-31 | Stop reason: HOSPADM

## 2022-03-31 RX ORDER — METHYLERGONOVINE MALEATE 0.2 MG/ML
200 INJECTION INTRAVENOUS
Status: DISCONTINUED | OUTPATIENT
Start: 2022-03-31 | End: 2022-03-31

## 2022-03-31 RX ORDER — MISOPROSTOL 200 UG/1
800 TABLET ORAL
Status: DISCONTINUED | OUTPATIENT
Start: 2022-03-31 | End: 2022-04-01 | Stop reason: HOSPADM

## 2022-03-31 RX ORDER — MISOPROSTOL 200 UG/1
800 TABLET ORAL
Status: DISCONTINUED | OUTPATIENT
Start: 2022-03-31 | End: 2022-03-31 | Stop reason: HOSPADM

## 2022-03-31 RX ORDER — DEXTROSE MONOHYDRATE 25 G/50ML
25-50 INJECTION, SOLUTION INTRAVENOUS
Status: DISCONTINUED | OUTPATIENT
Start: 2022-03-31 | End: 2022-03-31 | Stop reason: HOSPADM

## 2022-03-31 RX ADMIN — BUPIVACAINE HYDROCHLORIDE 12 ML/HR: 7.5 INJECTION, SOLUTION EPIDURAL; RETROBULBAR at 13:53

## 2022-03-31 RX ADMIN — SODIUM CHLORIDE, POTASSIUM CHLORIDE, SODIUM LACTATE AND CALCIUM CHLORIDE 1000 ML: 600; 310; 30; 20 INJECTION, SOLUTION INTRAVENOUS at 13:32

## 2022-03-31 RX ADMIN — Medication 340 ML/HR: at 16:15

## 2022-03-31 RX ADMIN — IBUPROFEN 800 MG: 400 TABLET ORAL at 17:21

## 2022-03-31 RX ADMIN — SODIUM CHLORIDE, SODIUM LACTATE, POTASSIUM CHLORIDE, CALCIUM CHLORIDE AND DEXTROSE MONOHYDRATE: 5; 600; 310; 30; 20 INJECTION, SOLUTION INTRAVENOUS at 14:32

## 2022-03-31 RX ADMIN — SODIUM CHLORIDE, POTASSIUM CHLORIDE, SODIUM LACTATE AND CALCIUM CHLORIDE: 600; 310; 30; 20 INJECTION, SOLUTION INTRAVENOUS at 08:30

## 2022-03-31 RX ADMIN — IBUPROFEN 800 MG: 400 TABLET ORAL at 23:28

## 2022-03-31 RX ADMIN — ACETAMINOPHEN 650 MG: 325 TABLET ORAL at 23:28

## 2022-03-31 RX ADMIN — DOCUSATE SODIUM 100 MG: 100 CAPSULE, LIQUID FILLED ORAL at 17:21

## 2022-03-31 RX ADMIN — ACETAMINOPHEN 650 MG: 325 TABLET ORAL at 17:21

## 2022-03-31 RX ADMIN — Medication 2 MILLI-UNITS/MIN: at 08:46

## 2022-03-31 ASSESSMENT — ACTIVITIES OF DAILY LIVING (ADL)
ADLS_ACUITY_SCORE: 3

## 2022-03-31 NOTE — L&D DELIVERY NOTE
OB Delivery Summary      HISTORY OF PRESENT ILLNESS:   with  IUP @ 39w0d who presented for IOL due to GDMA2.  Her prenatal course was  complicated by GDMA2, insulin started at 32 weeks.  Prenatal labs were significant for blood type A+ rubella status immune.  Glucose challenge test 151 (3 hour abnormal).  Group beta strep culture was negative.  Estimated fetal weight on admission was approximately 7.5 lbs        FIRST STAGE OF LABOR:  The patient was admitted to Labor and Delivery with a fetal heart rate tracing that showed baseline 130, moderate variability, ++accels, no decels (category 1) She was willi infrequently. Cervix was 1/50/-3 with poorly applied fetal vertex. Bedside ultrasound confirmed vertex presentation. Pitocin titration was begun, and 2.5 hours later AROM was performed as the cervix changed to 2/50/-2 and anterior with good head engagement. Clear fluid resulted. The patient became increasingly uncomfortable and received an epidural at her request for pain management. She progressed to completely dilated at 15:38. I was notified and presented for delivery.      SECOND STAGE OF LABOR:  The patient began pushing at 16:08.  She effectively brought the vertex down in the birth canal and delivered a viable female infant at 16:13.  After delivery of the head, the anterior shoulder and body delivered without difficulty.  A single nuchal cord was noted. The infant was delivered through the nuchal cord, which was subsequently reduced. The infant was then placed on the mother's chest.  Delayed cord clamping was performed.        THIRD STAGE OF LABOR:  The cord was clamped and cut. The placenta then delivered spontaneously and appeared intact with a 3-vessel cord at 16:16.  Pitocin was started and fundal massage was performed.  Perineum was inspected superficial abrasions but no lacerations noted.  Quantitative blood loss for the delivery was 105 mL.       Both mother and baby tolerated delivery  well and there were no complications.  weight is pending. Apgars were 9 and 9 at 1 and 5 minutes, respectively.       Adelina Freed MD, MD  3/31/2022  4:24 PM

## 2022-03-31 NOTE — PLAN OF CARE
0747 -  at 39+0/7 weeks admitted to L&D for induction of labor due to GDIC.  POC discussed including monitoring, lab tests, SVE, possible AROM, pitocin and induction process.  Patient verbally consents at this time.  Monitors applied.  Admission assessment completed.  VSS.    0815 - Dr. Freed at bedside.  Initial SVE by MD.  MD requests bedside US to verify fetal position.  Vertex presentation confirmed by MD.  Second SVE by MD; unable to AROM due to fetal station.  Plan per MD is to start pitocin and AROM when able.  Patient verbalizes understanding and consents.      0830 - IV started in RFA.  LR infusing well.  Pitocin discussed with patient and  who verbally consent to pitocin induction.      0846 - Pitocin started per MD order.

## 2022-03-31 NOTE — H&P
"OB Brief Admit H&P    No significant change in general health status based on examination of the patient, review of Nursing Admission Database and prenatal record.    Pt is a 31 year old  @ 39w0d who presented to L&D for IOL due to GDMA2. Some mild contractions. No LOF or vaginal bleeding. Baby active.    Patient's prenatal course has been complicated by:  1. GDMA2  2. History of GHTN first pregnancy, on ASA this pregnancy    Prenatal Labs:    Blood type A+  Rubella immune   (3 hour = failed)  GBS negative 3/10/22    EFW: 7.5 lbs    Ht 1.676 m (5' 6\")   Wt 84.4 kg (186 lb)   LMP 2021   BMI 30.02 kg/m    EFM:  130, moderate variability, ++accels, no decels (category 2)  [place]: Rare  SVE: 1/50%/-3, too high to rupture  Membranes:  Intact    Bedside ultrasound confirms vertex presentation    Assessment:  31 year old  @ 39w0d admitted for IOL due to GMDA2.    Plan:  1. Admit to labor and delivery   2. IOL   - Recommend pitocin prior to AROM due to poor engagement of fetal vertex  - Plan AROM when able  - Anticipate   3. GDMA2  - Intrapartum FSBS and sliding scale insulin prn  - Ok for diabetic diet this morning, then clear liquids when active labor  4. ID  - Neg Covid  - GBS negative 3/10/22  - S/p Tdap, Flu & Covid vaccines    Adelina Freed MD  3/31/2022  8:22 AM      "

## 2022-03-31 NOTE — ANESTHESIA PREPROCEDURE EVALUATION
Anesthesia Pre-Procedure Evaluation    Patient: Lisa Irene   MRN: [id number] : 1990        Procedure : * No procedures listed *          Past Medical History:   Diagnosis Date     Diabetes (H)     GDIC; on insulin at 30-32 weeks with second pregnancy     Hypertension     had Gest HTN with 1st, and started 3/24/22 with this preg      Past Surgical History:   Procedure Laterality Date     HC TOOTH EXTRACTION W/FORCEP  10/2007      No Known Allergies   Social History     Tobacco Use     Smoking status: Former Smoker     Quit date: 2020     Years since quittin.2     Smokeless tobacco: Never Used   Substance Use Topics     Alcohol use: Not Currently      Wt Readings from Last 1 Encounters:   22 84.4 kg (186 lb)        Anesthesia Evaluation   Pt has had prior anesthetic.     No history of anesthetic complications       ROS/MED HX  ENT/Pulmonary:    (-) sleep apnea   Neurologic:       Cardiovascular:       METS/Exercise Tolerance:     Hematologic:       Musculoskeletal:       GI/Hepatic:    (-) GERD   Renal/Genitourinary:       Endo:     (+) type II DM (gestational),     Psychiatric/Substance Use:       Infectious Disease:       Malignancy:       Other:            Physical Exam    Airway        Mallampati: II   TM distance: > 3 FB   Neck ROM: full   Mouth opening: > 3 cm    Respiratory Devices and Support         Dental  no notable dental history         Cardiovascular   cardiovascular exam normal          Pulmonary   pulmonary exam normal                OUTSIDE LABS:  CBC:   Lab Results   Component Value Date    WBC 11.9 (H) 2022    WBC 13.0 (H) 2022    HGB 12.0 2022    HGB 11.9 2022    HCT 35.7 2022    HCT 36.1 2022     2022     2022     BMP:   Lab Results   Component Value Date    CR 0.70 2022     (H) 2022     COAGS: No results found for: PTT, INR, FIBR  POC: No results found for: BGM, HCG, HCGS  HEPATIC:    Lab Results   Component Value Date    ALT 14 03/28/2022    AST 12 03/28/2022     OTHER:   Lab Results   Component Value Date    TSH 1.27 07/26/2008       Anesthesia Plan    ASA Status:  2   NPO Status:  NPO Appropriate    Anesthesia Type: Epidural.              Consents    Anesthesia Plan(s) and associated risks, benefits, and realistic alternatives discussed. Questions answered and patient/representative(s) expressed understanding.    - Discussed:     - Discussed with:  Patient         Postoperative Care            Comments:    Other Comments: Multip (2nd delivery) being induced for gestational HTN.    Otherwise healthy.              Anam Tom MD

## 2022-03-31 NOTE — PROGRESS NOTES
"OB Progress Note  3/31/2022  11:07 AM    S:  Pt feeling mild contractions on pitocin. Has been sitting on birthing ball as well.  No other complaints.      O:  /80   Temp 98.6  F (37  C) (Temporal)   Resp 16   Ht 1.676 m (5' 6\")   Wt 84.4 kg (186 lb)   LMP 2021   BMI 30.02 kg/m    EFM: baseline 150, accelerations absent, early decelerations, moderate variability; Category 1  [place]:  Ctx q2-4 min  SVE:  2/50%/-2  Membranes: AROM, clear this check.    Pitocin at 6 mU/min     - 1 hour PC breakfast    A/P:  31 year old  @ 39w0d admitted for IOL due to GMDA2.     Plan:  1. Admit to labor and delivery   2. IOL   - S/p AROM  - Continue pitocin titration to achieve / maintain active labor  - Ok for epidural  - Anticipate   3. GDMA2  - Intrapartum FSBS and sliding scale insulin prn  - Ok for diabetic diet this morning, then clear liquids when active labor  4. ID  - Neg Covid  - GBS negative 3/10/22  - S/p Tdap, Flu & Covid vaccines    Adelina Freed MD    "

## 2022-03-31 NOTE — ANESTHESIA PROCEDURE NOTES
Epidural catheter Procedure Note    Pre-Procedure   Staff -        Anesthesiologist:  Anam Tom MD       Performed By: anesthesiologist       Location: OB       Pre-Anesthestic Checklist: patient identified, IV checked, risks and benefits discussed, informed consent, pre-op evaluation and at physician/surgeon's request  Timeout:       Correct Patient: Yes        Correct Procedure: Yes        Correct Position: Yes   Procedure Documentation  Procedure: epidural catheter       Patient Position: sitting       Patient Prep/Sterile Barriers: sterile gloves, mask, patient draped, 3 applications of betadine, and waited for it to completely dry       Skin prep: Betadine       Local skin infiltrated with 3 mL of 1% lidocaine.        Insertion Site: L3-4. (midline approach).       Technique: LORT air        KEIRA at 4 cm.       Needle Type: ToAracay needle       Needle Gauge: 17.        Needle Length (Inches): 3.5        Catheter: 19 G.         Catheter threaded easily.         4 cm epidural space.        # of attempts: 1 and  # of redirects:     Assessment/Narrative         Paresthesias: No.     Test dose of mL lidocaine 1.5% w/ 1:200,000 epinephrine at.         Test dose negative, 3 minutes after injection, for signs of intravascular, subdural, or intrathecal injection.       Insertion/Infusion Method: LORT air       Aspiration negative for Heme or CSF via Epidural Catheter.    Medication(s) Administered   0.125% Bupivacaine + 2 mcg/mL Fentanyl via CADD (Epidural), 10 mL   Comments:  Test dose of 3cc negative, then additional 2cc of test dose given.    Adhesive spray, tegaderm, and tape to secure  Epidural bolused with pump mix    Orders to manage the epidural infusion have been entered and, through coordination with the nurse, we will continue to manage and monitor the patient's labor epidural.  We will continuously be available to adjust as needed throughout the entire labor and delivery process.

## 2022-03-31 NOTE — PROGRESS NOTES
"S: Delivery Note    B: , 39.0 weeks.  MIOL: GDMA2. Maternal labs: A positive, rubella immune, Hep B non-reactive, HIV non-reactive, GBS negative, COVID negative 3/28/22.  Plans to use [facility] (plan for on call: [facility]).     A:  of female infant (\"[place]\").  Breastfeeding. Small laceration without repair.  Fundus firm, midline, scant rubra lochia, no clots.  QBL 105cc.  IV infusing per MAR.  Due to void at first postdelivery ambulation.      R: Continue with plan of care.   "

## 2022-04-01 VITALS
HEART RATE: 69 BPM | BODY MASS INDEX: 29.89 KG/M2 | TEMPERATURE: 98.3 F | SYSTOLIC BLOOD PRESSURE: 111 MMHG | HEIGHT: 66 IN | OXYGEN SATURATION: 97 % | WEIGHT: 186 LBS | RESPIRATION RATE: 16 BRPM | DIASTOLIC BLOOD PRESSURE: 73 MMHG

## 2022-04-01 LAB
GLUCOSE BLDC GLUCOMTR-MCNC: 85 MG/DL (ref 70–99)
HGB BLD-MCNC: 11.3 G/DL (ref 11.7–15.7)

## 2022-04-01 PROCEDURE — 36415 COLL VENOUS BLD VENIPUNCTURE: CPT | Performed by: OBSTETRICS & GYNECOLOGY

## 2022-04-01 PROCEDURE — 85018 HEMOGLOBIN: CPT | Performed by: OBSTETRICS & GYNECOLOGY

## 2022-04-01 PROCEDURE — 250N000013 HC RX MED GY IP 250 OP 250 PS 637: Performed by: OBSTETRICS & GYNECOLOGY

## 2022-04-01 RX ORDER — ACETAMINOPHEN 325 MG/1
650 TABLET ORAL EVERY 4 HOURS PRN
COMMUNITY
Start: 2022-04-01

## 2022-04-01 RX ORDER — DOCUSATE SODIUM 100 MG/1
100 CAPSULE, LIQUID FILLED ORAL DAILY
COMMUNITY
Start: 2022-04-01

## 2022-04-01 RX ORDER — IBUPROFEN 800 MG/1
800 TABLET, FILM COATED ORAL EVERY 6 HOURS PRN
COMMUNITY
Start: 2022-04-01

## 2022-04-01 RX ADMIN — IBUPROFEN 800 MG: 400 TABLET ORAL at 18:41

## 2022-04-01 RX ADMIN — ACETAMINOPHEN 650 MG: 325 TABLET ORAL at 09:43

## 2022-04-01 RX ADMIN — DOCUSATE SODIUM 100 MG: 100 CAPSULE, LIQUID FILLED ORAL at 09:43

## 2022-04-01 RX ADMIN — IBUPROFEN 800 MG: 400 TABLET ORAL at 12:18

## 2022-04-01 RX ADMIN — ACETAMINOPHEN 650 MG: 325 TABLET ORAL at 05:45

## 2022-04-01 RX ADMIN — IBUPROFEN 800 MG: 400 TABLET ORAL at 05:45

## 2022-04-01 RX ADMIN — ACETAMINOPHEN 650 MG: 325 TABLET ORAL at 15:15

## 2022-04-01 ASSESSMENT — ACTIVITIES OF DAILY LIVING (ADL)
ADLS_ACUITY_SCORE: 3

## 2022-04-01 NOTE — DISCHARGE SUMMARY
"OB Post-partum Note  PPD# 1    S:  Patient doing well.  Pain controlled with oral medications.  Voiding independently.  Bleeding minimal.  Breast feeding well.    O:  /81   Pulse 69   Temp 98.3  F (36.8  C) (Oral)   Resp 18   Ht 1.676 m (5' 6\")   Wt 84.4 kg (186 lb)   LMP 2021   SpO2 97%   Breastfeeding Unknown   BMI 30.02 kg/m    Gen- A&O, NAD  Abd- Non-tender, fundus firm at umbilicus  Ext- non-tender, scant edema    Hemoglobin   Date Value Ref Range Status   2022 11.3 (L) 11.7 - 15.7 g/dL Final   2012 12.7 12 - 16 GM/DL Final     A POS  Rubella Immune    A/P: 31 year old  PPD# 1 s/p     1.  Routine post-partum cares.  2.  Anticipate d/c home this afternoon pending  screens.  3.  Follow up with OB for routine 6 week check.   4.  Follow up with Endocrinology OR do fasting 2-hour glucose test with OB at 6 weeks postpartum.    CORNEL Luna CNM  2022  8:48 AM  "

## 2022-04-01 NOTE — PLAN OF CARE
Data: Lisa Irene transferred to [facility] via wheelchair at 1935. Baby transferred via parent's arms.  Action: Receiving unit notified of transfer: Yes. Patient and family notified of room change. Report given to Nikki Alex RN at 1935. Belongings sent to receiving unit. Accompanied by Registered Nurse. Oriented patient to surroundings. Call light within reach. ID bands double-checked with receiving RN.  Response: Patient tolerated transfer and is stable.

## 2022-04-01 NOTE — PLAN OF CARE
VSS. Voiding adequately on own. Scant vag bleeding. Pain controlled w/ ibuprofen and tylenol. Breast feeding well independently, infant sleepy for some feeds.

## 2022-04-02 NOTE — ANESTHESIA POSTPROCEDURE EVALUATION
Patient: Lisa Irene    Procedure: * No procedures listed *       Anesthesia Type:  Epidural    Note:  Disposition: Outpatient   Postop Pain Control: Uneventful            Sign Out: Well controlled pain   PONV:    Neuro/Psych:    Airway/Respiratory:    CV/Hemodynamics:    Other NRE: NONE   DID A NON-ROUTINE EVENT OCCUR?     Event details/Postop Comments:  No issues with epidural           Last vitals:  Vitals Value Taken Time   BP     Temp     Pulse     Resp     SpO2         Electronically Signed By: Elly Serra  April 2, 2022  7:07 AM

## 2023-01-07 ENCOUNTER — HEALTH MAINTENANCE LETTER (OUTPATIENT)
Age: 33
End: 2023-01-07

## 2023-04-22 ENCOUNTER — HEALTH MAINTENANCE LETTER (OUTPATIENT)
Age: 33
End: 2023-04-22

## 2023-06-30 ENCOUNTER — LAB REQUISITION (OUTPATIENT)
Dept: LAB | Facility: CLINIC | Age: 33
End: 2023-06-30

## 2023-06-30 DIAGNOSIS — R35.0 FREQUENCY OF MICTURITION: ICD-10-CM

## 2023-06-30 PROCEDURE — 87086 URINE CULTURE/COLONY COUNT: CPT | Performed by: NURSE PRACTITIONER

## 2023-07-02 LAB — BACTERIA UR CULT: NORMAL

## 2024-06-29 ENCOUNTER — HEALTH MAINTENANCE LETTER (OUTPATIENT)
Age: 34
End: 2024-06-29